# Patient Record
Sex: FEMALE | Race: BLACK OR AFRICAN AMERICAN | NOT HISPANIC OR LATINO | Employment: FULL TIME | URBAN - METROPOLITAN AREA
[De-identification: names, ages, dates, MRNs, and addresses within clinical notes are randomized per-mention and may not be internally consistent; named-entity substitution may affect disease eponyms.]

---

## 2023-01-04 ENCOUNTER — HOSPITAL ENCOUNTER (EMERGENCY)
Facility: HOSPITAL | Age: 33
Discharge: HOME | End: 2023-01-04
Attending: STUDENT IN AN ORGANIZED HEALTH CARE EDUCATION/TRAINING PROGRAM
Payer: COMMERCIAL

## 2023-01-04 VITALS
SYSTOLIC BLOOD PRESSURE: 149 MMHG | OXYGEN SATURATION: 97 % | TEMPERATURE: 99 F | WEIGHT: 250 LBS | DIASTOLIC BLOOD PRESSURE: 90 MMHG | RESPIRATION RATE: 18 BRPM | BODY MASS INDEX: 33.86 KG/M2 | HEIGHT: 72 IN | HEART RATE: 68 BPM
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
## 2023-01-04 DIAGNOSIS — S80.02XA CONTUSION OF LEFT KNEE, INITIAL ENCOUNTER: Primary | ICD-10-CM

## 2023-01-04 DIAGNOSIS — S39.012A LUMBAR STRAIN, INITIAL ENCOUNTER: ICD-10-CM

## 2023-01-04 LAB — B-HCG SERPL QL: NEGATIVE

## 2023-01-04 PROCEDURE — 81025 URINE PREGNANCY TEST: CPT | Performed by: PHYSICIAN ASSISTANT

## 2023-01-04 PROCEDURE — 99284 EMERGENCY DEPT VISIT MOD MDM: CPT

## 2023-01-04 RX ORDER — METHOCARBAMOL 500 MG/1
1000 TABLET, FILM COATED ORAL 3 TIMES DAILY
Qty: 30 TABLET | Refills: 0 | Status: SHIPPED | OUTPATIENT
Start: 2023-01-04 | End: 2023-01-09
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
## 2023-01-04 NOTE — DISCHARGE INSTRUCTIONS
Apply ice and heat to the affected areas for symptom relief. Take muscle relaxer as needed for muscle spasms. Alternate tylenol and ibuprofen at home as needed for soreness.

## 2023-01-04 NOTE — Clinical Note
"Анна Shahmiguel Stein was seen and treated in our emergency department on 1/4/2023.  She may return to work on 01/06/2023.       If you have any questions or concerns, please don't hesitate to call.      Cy Pack PA-C"

## 2023-06-08 DIAGNOSIS — N80.50 ENDOMETRIOSIS OF INTESTINE: Primary | ICD-10-CM

## 2023-06-08 DIAGNOSIS — N80.9 ENDOMETRIOSIS: Primary | ICD-10-CM

## 2023-06-22 NOTE — PROGRESS NOTES
History & Physical    CHIEF COMPLAINT:  ENDOMETRIOSIS     History of Present Illness:  32 year-old-female referred by Dr. Barbosa.  Patient underwent diagnostic laparoscopy in January 2019 for pelvic pain; findings of brown endometriotic lesions throughout the peritoneum with some bowel involvement, endometriosis on the serosa of the uterus and the ovaries adhered at the midline and posteriorly, bowel adhesions as well.  She was admitted to the hospital in January 2021 with a catamenial pneumothorax per CT chest.  CT abd/pel at that time showed nodularity and fat stranding adjacent the sigmoid colon and distal descending colon which may be a sequelae of history of endometriosis.  She has been referred for evaluation of resection of intestinal lesions and diaphragmatic lesions by Dr. Boo.  She underwent colonoscopy last year which she states was normal with no evidence of intraluminal endometriosis.  She has occasional abdominal cramping around the time of her cycles.  No current respiratory symptoms.      Review of patient's allergies indicates:  No Known Allergies    Current Outpatient Medications   Medication Sig Dispense Refill    norethindrone (AYGESTIN) 5 mg Tab Take by mouth 2 (two) times a day.       No current facility-administered medications for this visit.       Past Medical History:   Diagnosis Date    Endometriosis, unspecified      Past Surgical History:   Procedure Laterality Date    DIAGNOSTIC LAPAROSCOPY  2019    DIAGNOSTIC LAPAROSCOPY  04/01/2022     Family History   Problem Relation Age of Onset    Hypertension Father     Diabetes Mother     Hypertension Mother     Fibroids Mother     Thyroid cancer Mother     Hypertension Other     Diabetes Other     Thyroid cancer Other      Social History     Tobacco Use    Smoking status: Never    Smokeless tobacco: Never   Substance Use Topics    Alcohol use: Yes     Alcohol/week: 2.0 standard drinks     Types: 2 Drinks containing 0.5 oz of alcohol per week         Review of Systems:  Review of Systems   Constitutional:  Negative for appetite change, chills, diaphoresis and fever.   HENT:  Negative for congestion, drooling, ear discharge, ear pain and hearing loss.    Eyes:  Negative for discharge.   Respiratory:  Negative for apnea, cough, choking, chest tightness, shortness of breath and stridor.    Cardiovascular:  Negative for chest pain, palpitations and leg swelling.   Endocrine: Negative for cold intolerance and heat intolerance.   Genitourinary:  Negative for difficulty urinating, dyspareunia, dysuria and hematuria.   Musculoskeletal:  Negative for arthralgias, gait problem and joint swelling.   Skin:  Negative for color change and rash.   Neurological:  Negative for dizziness, tremors, seizures, syncope, facial asymmetry, speech difficulty, light-headedness, numbness and headaches.   Psychiatric/Behavioral:  Negative for agitation and confusion.      OBJECTIVE:     Vital Signs (Most Recent)              Physical Exam:  Physical Exam  Constitutional:       General: She is not in acute distress.     Appearance: Normal appearance. She is not toxic-appearing.   HENT:      Head: Normocephalic and atraumatic.      Right Ear: External ear normal.      Left Ear: External ear normal.      Mouth/Throat:      Mouth: Mucous membranes are moist.   Eyes:      General: No scleral icterus.     Conjunctiva/sclera: Conjunctivae normal.      Pupils: Pupils are equal, round, and reactive to light.   Cardiovascular:      Rate and Rhythm: Normal rate and regular rhythm.      Pulses: Normal pulses.   Pulmonary:      Effort: Pulmonary effort is normal. No respiratory distress.      Breath sounds: Normal breath sounds. No stridor. No wheezing or rhonchi.   Abdominal:      General: Abdomen is flat. There is no distension.      Palpations: Abdomen is soft. There is no mass.      Tenderness: There is no abdominal tenderness. There is no guarding or rebound.      Hernia: No hernia is  present.   Musculoskeletal:         General: No swelling or tenderness. Normal range of motion.      Cervical back: Normal range of motion and neck supple.      Right lower leg: No edema.      Left lower leg: No edema.   Skin:     General: Skin is warm.      Capillary Refill: Capillary refill takes less than 2 seconds.      Coloration: Skin is not jaundiced or pale.      Findings: No erythema or rash.   Neurological:      General: No focal deficit present.      Mental Status: She is alert and oriented to person, place, and time.      Gait: Gait normal.   Psychiatric:         Mood and Affect: Mood normal.         Behavior: Behavior normal.         Judgment: Judgment normal.         ASSESSMENT/PLAN:     Endometriosis with clinical history of previous diaphragmatic and intestinal involvement    CT scan chest abdomen and pelvis, p.o. and IV contrast to further stage extent of disease    Return to clinic after imaging

## 2023-06-27 ENCOUNTER — OFFICE VISIT (OUTPATIENT)
Dept: SURGICAL ONCOLOGY | Facility: CLINIC | Age: 33
End: 2023-06-27
Payer: COMMERCIAL

## 2023-06-27 VITALS
HEIGHT: 72 IN | BODY MASS INDEX: 32.32 KG/M2 | SYSTOLIC BLOOD PRESSURE: 146 MMHG | WEIGHT: 238.63 LBS | HEART RATE: 65 BPM | DIASTOLIC BLOOD PRESSURE: 97 MMHG

## 2023-06-27 DIAGNOSIS — N80.50 ENDOMETRIOSIS OF INTESTINE: ICD-10-CM

## 2023-06-27 DIAGNOSIS — N80.50 ENDOMETRIOSIS OF INTESTINE: Primary | ICD-10-CM

## 2023-06-27 PROCEDURE — 1159F MED LIST DOCD IN RCRD: CPT | Mod: CPTII,S$GLB,, | Performed by: SURGERY

## 2023-06-27 PROCEDURE — 99999 PR PBB SHADOW E&M-EST. PATIENT-LVL III: CPT | Mod: PBBFAC,,, | Performed by: SURGERY

## 2023-06-27 PROCEDURE — 3008F PR BODY MASS INDEX (BMI) DOCUMENTED: ICD-10-PCS | Mod: CPTII,S$GLB,, | Performed by: SURGERY

## 2023-06-27 PROCEDURE — 3077F SYST BP >= 140 MM HG: CPT | Mod: CPTII,S$GLB,, | Performed by: SURGERY

## 2023-06-27 PROCEDURE — 1159F PR MEDICATION LIST DOCUMENTED IN MEDICAL RECORD: ICD-10-PCS | Mod: CPTII,S$GLB,, | Performed by: SURGERY

## 2023-06-27 PROCEDURE — 3080F DIAST BP >= 90 MM HG: CPT | Mod: CPTII,S$GLB,, | Performed by: SURGERY

## 2023-06-27 PROCEDURE — 3008F BODY MASS INDEX DOCD: CPT | Mod: CPTII,S$GLB,, | Performed by: SURGERY

## 2023-06-27 PROCEDURE — 99999 PR PBB SHADOW E&M-EST. PATIENT-LVL III: ICD-10-PCS | Mod: PBBFAC,,, | Performed by: SURGERY

## 2023-06-27 PROCEDURE — 3077F PR MOST RECENT SYSTOLIC BLOOD PRESSURE >= 140 MM HG: ICD-10-PCS | Mod: CPTII,S$GLB,, | Performed by: SURGERY

## 2023-06-27 PROCEDURE — 3080F PR MOST RECENT DIASTOLIC BLOOD PRESSURE >= 90 MM HG: ICD-10-PCS | Mod: CPTII,S$GLB,, | Performed by: SURGERY

## 2023-06-27 PROCEDURE — 99203 PR OFFICE/OUTPT VISIT, NEW, LEVL III, 30-44 MIN: ICD-10-PCS | Mod: S$GLB,,, | Performed by: SURGERY

## 2023-06-27 PROCEDURE — 99203 OFFICE O/P NEW LOW 30 MIN: CPT | Mod: S$GLB,,, | Performed by: SURGERY

## 2023-06-28 ENCOUNTER — OFFICE VISIT (OUTPATIENT)
Dept: CARDIAC SURGERY | Facility: CLINIC | Age: 33
End: 2023-06-28
Payer: COMMERCIAL

## 2023-06-28 VITALS
HEIGHT: 72 IN | BODY MASS INDEX: 32.42 KG/M2 | SYSTOLIC BLOOD PRESSURE: 128 MMHG | DIASTOLIC BLOOD PRESSURE: 85 MMHG | OXYGEN SATURATION: 99 % | HEART RATE: 60 BPM | WEIGHT: 239.38 LBS

## 2023-06-28 DIAGNOSIS — N80.9 ENDOMETRIOSIS: ICD-10-CM

## 2023-06-28 PROCEDURE — 3079F PR MOST RECENT DIASTOLIC BLOOD PRESSURE 80-89 MM HG: ICD-10-PCS | Mod: CPTII,,, | Performed by: THORACIC SURGERY (CARDIOTHORACIC VASCULAR SURGERY)

## 2023-06-28 PROCEDURE — 3008F PR BODY MASS INDEX (BMI) DOCUMENTED: ICD-10-PCS | Mod: CPTII,,, | Performed by: THORACIC SURGERY (CARDIOTHORACIC VASCULAR SURGERY)

## 2023-06-28 PROCEDURE — 1160F RVW MEDS BY RX/DR IN RCRD: CPT | Mod: CPTII,,, | Performed by: THORACIC SURGERY (CARDIOTHORACIC VASCULAR SURGERY)

## 2023-06-28 PROCEDURE — 3074F SYST BP LT 130 MM HG: CPT | Mod: CPTII,,, | Performed by: THORACIC SURGERY (CARDIOTHORACIC VASCULAR SURGERY)

## 2023-06-28 PROCEDURE — 3074F PR MOST RECENT SYSTOLIC BLOOD PRESSURE < 130 MM HG: ICD-10-PCS | Mod: CPTII,,, | Performed by: THORACIC SURGERY (CARDIOTHORACIC VASCULAR SURGERY)

## 2023-06-28 PROCEDURE — 1160F PR REVIEW ALL MEDS BY PRESCRIBER/CLIN PHARMACIST DOCUMENTED: ICD-10-PCS | Mod: CPTII,,, | Performed by: THORACIC SURGERY (CARDIOTHORACIC VASCULAR SURGERY)

## 2023-06-28 PROCEDURE — 3079F DIAST BP 80-89 MM HG: CPT | Mod: CPTII,,, | Performed by: THORACIC SURGERY (CARDIOTHORACIC VASCULAR SURGERY)

## 2023-06-28 PROCEDURE — 99204 OFFICE O/P NEW MOD 45 MIN: CPT | Mod: ,,, | Performed by: THORACIC SURGERY (CARDIOTHORACIC VASCULAR SURGERY)

## 2023-06-28 PROCEDURE — 1159F PR MEDICATION LIST DOCUMENTED IN MEDICAL RECORD: ICD-10-PCS | Mod: CPTII,,, | Performed by: THORACIC SURGERY (CARDIOTHORACIC VASCULAR SURGERY)

## 2023-06-28 PROCEDURE — 99204 PR OFFICE/OUTPT VISIT, NEW, LEVL IV, 45-59 MIN: ICD-10-PCS | Mod: ,,, | Performed by: THORACIC SURGERY (CARDIOTHORACIC VASCULAR SURGERY)

## 2023-06-28 PROCEDURE — 3008F BODY MASS INDEX DOCD: CPT | Mod: CPTII,,, | Performed by: THORACIC SURGERY (CARDIOTHORACIC VASCULAR SURGERY)

## 2023-06-28 PROCEDURE — 1159F MED LIST DOCD IN RCRD: CPT | Mod: CPTII,,, | Performed by: THORACIC SURGERY (CARDIOTHORACIC VASCULAR SURGERY)

## 2023-06-28 NOTE — PROGRESS NOTES
History & Physical    SUBJECTIVE:     History of Present Illness:  The patient is presenting for evaluation for possible diaphragmatic endometriosis.  She was supposed to have a CT scan that has been rescheduled.  She has a history of the above disease.  I am meeting the patient today for possible need for thoracic surgery intraop      Chief Complaint   Patient presents with    Pre-op Exam     REFERRAL DR. FERRARO.  COMBINED CASE W/ DR. FARFAN (APPT 6/27/23).  DX:  ENDOMETROSIS (2019) SPREAD TO DIAPHRAGM/INTESTINES, HX COLLAPSED LUNG.       Review of patient's allergies indicates:  No Known Allergies    Current Outpatient Medications   Medication Sig Dispense Refill    norethindrone (AYGESTIN) 5 mg Tab Take by mouth 2 (two) times a day.       No current facility-administered medications for this visit.       Past Medical History:   Diagnosis Date    Endometriosis, unspecified      Past Surgical History:   Procedure Laterality Date    DIAGNOSTIC LAPAROSCOPY  2019    DIAGNOSTIC LAPAROSCOPY  04/01/2022     Family History   Problem Relation Age of Onset    Hypertension Father     Diabetes Mother     Hypertension Mother     Fibroids Mother     Thyroid cancer Mother     Cancer Mother     Hypertension Other     Diabetes Other     Thyroid cancer Other     Cancer Maternal Grandfather     Cancer Maternal Grandmother     Cancer Maternal Aunt     Cancer Maternal Aunt      Social History     Tobacco Use    Smoking status: Never    Smokeless tobacco: Never   Substance Use Topics    Alcohol use: Yes     Alcohol/week: 2.0 standard drinks     Types: 2 Drinks containing 0.5 oz of alcohol per week    Drug use: Never        Review of Systems:  Review of Systems   Constitutional: Negative.    HENT: Negative.     Eyes: Negative.    Respiratory: Negative.     Cardiovascular: Negative.    Gastrointestinal: Negative.    Endocrine: Negative.    Genitourinary: Negative.    Musculoskeletal: Negative.         Claudications   Skin: Negative.   "  Allergic/Immunologic: Negative.    Neurological: Negative.    Hematological: Negative.    Psychiatric/Behavioral: Negative.       OBJECTIVE:     Vital Signs (Most Recent)  Pulse: 60 (06/28/23 1254)  BP: 128/85 (06/28/23 1254)  SpO2: 99 % (06/28/23 1254)  6' 2" (1.88 m)  108.6 kg (239 lb 6.4 oz)     Physical Exam:  Physical Exam  Vitals reviewed.   Constitutional:       Appearance: Normal appearance.   HENT:      Head: Normocephalic and atraumatic.      Nose: Nose normal.      Mouth/Throat:      Mouth: Mucous membranes are dry.      Pharynx: Oropharynx is clear.   Eyes:      Extraocular Movements: Extraocular movements intact.      Conjunctiva/sclera: Conjunctivae normal.      Pupils: Pupils are equal, round, and reactive to light.   Cardiovascular:      Rate and Rhythm: Normal rate and regular rhythm.      Pulses: Normal pulses.   Pulmonary:      Effort: Pulmonary effort is normal.      Breath sounds: Normal breath sounds.   Abdominal:      General: Abdomen is flat.      Palpations: Abdomen is soft.   Musculoskeletal:         General: Normal range of motion.      Cervical back: Neck supple.   Skin:     General: Skin is warm and dry.   Neurological:      General: No focal deficit present.   Psychiatric:         Mood and Affect: Mood normal.       Laboratory:  None      Diagnostic Results:  None      ASSESSMENT/PLAN:     Previous history of diaphragmatic endometriosis.  The patient is presenting today for possible evaluation for recurrent disease.  I will review her CT when done.  I will be very available to help in the operating room if needed.                  "

## 2023-07-19 ENCOUNTER — OFFICE VISIT (OUTPATIENT)
Dept: SURGICAL ONCOLOGY | Facility: CLINIC | Age: 33
End: 2023-07-19
Payer: COMMERCIAL

## 2023-07-19 VITALS
WEIGHT: 240.38 LBS | DIASTOLIC BLOOD PRESSURE: 97 MMHG | RESPIRATION RATE: 20 BRPM | BODY MASS INDEX: 32.56 KG/M2 | HEIGHT: 72 IN | SYSTOLIC BLOOD PRESSURE: 146 MMHG | OXYGEN SATURATION: 100 % | HEART RATE: 79 BPM | TEMPERATURE: 98 F

## 2023-07-19 DIAGNOSIS — N80.50 ENDOMETRIOSIS OF INTESTINE: Primary | ICD-10-CM

## 2023-07-19 PROCEDURE — 1159F MED LIST DOCD IN RCRD: CPT | Mod: CPTII,S$GLB,, | Performed by: SURGERY

## 2023-07-19 PROCEDURE — 99213 PR OFFICE/OUTPT VISIT, EST, LEVL III, 20-29 MIN: ICD-10-PCS | Mod: S$GLB,,, | Performed by: SURGERY

## 2023-07-19 PROCEDURE — 3080F DIAST BP >= 90 MM HG: CPT | Mod: CPTII,S$GLB,, | Performed by: SURGERY

## 2023-07-19 PROCEDURE — 3077F PR MOST RECENT SYSTOLIC BLOOD PRESSURE >= 140 MM HG: ICD-10-PCS | Mod: CPTII,S$GLB,, | Performed by: SURGERY

## 2023-07-19 PROCEDURE — 3008F PR BODY MASS INDEX (BMI) DOCUMENTED: ICD-10-PCS | Mod: CPTII,S$GLB,, | Performed by: SURGERY

## 2023-07-19 PROCEDURE — 3077F SYST BP >= 140 MM HG: CPT | Mod: CPTII,S$GLB,, | Performed by: SURGERY

## 2023-07-19 PROCEDURE — 99999 PR PBB SHADOW E&M-EST. PATIENT-LVL III: CPT | Mod: PBBFAC,,, | Performed by: SURGERY

## 2023-07-19 PROCEDURE — 99213 OFFICE O/P EST LOW 20 MIN: CPT | Mod: S$GLB,,, | Performed by: SURGERY

## 2023-07-19 PROCEDURE — 99999 PR PBB SHADOW E&M-EST. PATIENT-LVL III: ICD-10-PCS | Mod: PBBFAC,,, | Performed by: SURGERY

## 2023-07-19 PROCEDURE — 3008F BODY MASS INDEX DOCD: CPT | Mod: CPTII,S$GLB,, | Performed by: SURGERY

## 2023-07-19 PROCEDURE — 3080F PR MOST RECENT DIASTOLIC BLOOD PRESSURE >= 90 MM HG: ICD-10-PCS | Mod: CPTII,S$GLB,, | Performed by: SURGERY

## 2023-07-19 PROCEDURE — 1159F PR MEDICATION LIST DOCUMENTED IN MEDICAL RECORD: ICD-10-PCS | Mod: CPTII,S$GLB,, | Performed by: SURGERY

## 2023-07-19 NOTE — PROGRESS NOTES
History & Physical    CHIEF COMPLAINT:  ENDOMETRIOSIS     History of Present Illness:  33 year-old-female referred by Dr. Barbosa.  Patient underwent diagnostic laparoscopy in January 2019 for pelvic pain; findings of brown endometriotic lesions throughout the peritoneum with some bowel involvement, endometriosis on the serosa of the uterus and the ovaries adhered at the midline and posteriorly, bowel adhesions as well.  She was admitted to the hospital in January 2021 with a catamenial pneumothorax per CT chest.  CT abd/pel at that time showed nodularity and fat stranding adjacent the sigmoid colon and distal descending colon which may be a sequelae of history of endometriosis.  She has been referred for evaluation of resection of intestinal lesions and diaphragmatic lesions by Dr. Boo.  She underwent colonoscopy last year which she states was normal with no evidence of intraluminal endometriosis.  She has occasional abdominal cramping around the time of her cycles.  No current respiratory symptoms.    She returns after CT and I reviewed the images and reports. There is evidence of endometriosis and adhesions but no evidence of bowel obstruction.     She reports her symptoms have been very manageable lately      Review of patient's allergies indicates:  No Known Allergies    Current Outpatient Medications   Medication Sig Dispense Refill    norethindrone (AYGESTIN) 5 mg Tab Take by mouth 2 (two) times a day.       No current facility-administered medications for this visit.       Past Medical History:   Diagnosis Date    Endometriosis, unspecified      Past Surgical History:   Procedure Laterality Date    DIAGNOSTIC LAPAROSCOPY  2019    DIAGNOSTIC LAPAROSCOPY  04/01/2022     Family History   Problem Relation Age of Onset    Hypertension Father     Diabetes Mother     Hypertension Mother     Fibroids Mother     Thyroid cancer Mother     Cancer Mother     Hypertension Other     Diabetes Other     Thyroid cancer  "Other     Cancer Maternal Grandfather     Cancer Maternal Grandmother     Cancer Maternal Aunt     Cancer Maternal Aunt      Social History     Tobacco Use    Smoking status: Never    Smokeless tobacco: Never   Substance Use Topics    Alcohol use: Yes     Alcohol/week: 2.0 standard drinks of alcohol     Types: 2 Drinks containing 0.5 oz of alcohol per week    Drug use: Never        Review of Systems:  Review of Systems   Constitutional:  Negative for appetite change, chills, diaphoresis and fever.   HENT:  Negative for congestion, drooling, ear discharge, ear pain and hearing loss.    Eyes:  Negative for discharge.   Respiratory:  Negative for apnea, cough, choking, chest tightness, shortness of breath and stridor.    Cardiovascular:  Negative for chest pain, palpitations and leg swelling.   Endocrine: Negative for cold intolerance and heat intolerance.   Genitourinary:  Negative for difficulty urinating, dyspareunia, dysuria and hematuria.   Musculoskeletal:  Negative for arthralgias, gait problem and joint swelling.   Skin:  Negative for color change and rash.   Neurological:  Negative for dizziness, tremors, seizures, syncope, facial asymmetry, speech difficulty, light-headedness, numbness and headaches.   Psychiatric/Behavioral:  Negative for agitation and confusion.        OBJECTIVE:     Vital Signs (Most Recent)  Temp: 98.2 °F (36.8 °C) (07/19/23 1427)  Pulse: 79 (07/19/23 1427)  Resp: 20 (07/19/23 1427)  BP: (!) 146/97 (07/19/23 1427)  SpO2: 100 % (07/19/23 1427)  6' 2" (1.88 m)  109 kg (240 lb 6.4 oz)     Physical Exam:  Physical Exam  Constitutional:       General: She is not in acute distress.     Appearance: Normal appearance. She is not toxic-appearing.   HENT:      Head: Normocephalic and atraumatic.      Right Ear: External ear normal.      Left Ear: External ear normal.      Mouth/Throat:      Mouth: Mucous membranes are moist.   Eyes:      General: No scleral icterus.     Conjunctiva/sclera: " Conjunctivae normal.      Pupils: Pupils are equal, round, and reactive to light.   Cardiovascular:      Rate and Rhythm: Normal rate and regular rhythm.      Pulses: Normal pulses.   Pulmonary:      Effort: Pulmonary effort is normal. No respiratory distress.      Breath sounds: Normal breath sounds. No stridor. No wheezing or rhonchi.   Abdominal:      General: Abdomen is flat. There is no distension.      Palpations: Abdomen is soft. There is no mass.      Tenderness: There is no abdominal tenderness. There is no guarding or rebound.      Hernia: No hernia is present.   Musculoskeletal:         General: No swelling or tenderness. Normal range of motion.      Cervical back: Normal range of motion and neck supple.      Right lower leg: No edema.      Left lower leg: No edema.   Skin:     General: Skin is warm.      Capillary Refill: Capillary refill takes less than 2 seconds.      Coloration: Skin is not jaundiced or pale.      Findings: No erythema or rash.   Neurological:      General: No focal deficit present.      Mental Status: She is alert and oriented to person, place, and time.      Gait: Gait normal.   Psychiatric:         Mood and Affect: Mood normal.         Behavior: Behavior normal.         Judgment: Judgment normal.           ASSESSMENT/PLAN:     Endometriosis with clinical history of previous diaphragmatic and intestinal involvement    Symptoms seem manageable at this time. I explained that surgical intervention may be quite complex.     I would be happy to assist if her GYN believes surgery becomes warranted.     RTC prn